# Patient Record
Sex: FEMALE | Race: AMERICAN INDIAN OR ALASKA NATIVE | Employment: FULL TIME | URBAN - METROPOLITAN AREA
[De-identification: names, ages, dates, MRNs, and addresses within clinical notes are randomized per-mention and may not be internally consistent; named-entity substitution may affect disease eponyms.]

---

## 2021-07-01 ENCOUNTER — PATIENT MESSAGE (OUTPATIENT)
Dept: ADMINISTRATIVE | Facility: OTHER | Age: 31
End: 2021-07-01

## 2022-12-01 ENCOUNTER — TELEPHONE (OUTPATIENT)
Dept: PSYCHIATRY | Facility: CLINIC | Age: 32
End: 2022-12-01

## 2022-12-01 NOTE — TELEPHONE ENCOUNTER
----- Message from Laura Fernandez sent at 12/1/2022 10:17 AM CST -----  Regarding: Voicemail  Called for np appointment

## 2022-12-01 NOTE — TELEPHONE ENCOUNTER
----- Message from Laura Fernandez sent at 12/1/2022 11:31 AM CST -----  Regarding: FW: Voicemail  Patient called back  ----- Message -----  From: Laura Fernandez  Sent: 12/1/2022  10:17 AM CST  To: Kerwin Houston MA  Subject: Voicemail                                        Called for np appointment

## 2023-05-19 PROBLEM — N87.0 DYSPLASIA OF CERVIX, LOW GRADE (CIN 1): Status: ACTIVE | Noted: 2023-05-19

## 2023-07-19 ENCOUNTER — PATIENT MESSAGE (OUTPATIENT)
Dept: RESEARCH | Facility: HOSPITAL | Age: 33
End: 2023-07-19